# Patient Record
Sex: FEMALE | Employment: UNEMPLOYED | URBAN - NONMETROPOLITAN AREA
[De-identification: names, ages, dates, MRNs, and addresses within clinical notes are randomized per-mention and may not be internally consistent; named-entity substitution may affect disease eponyms.]

---
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
## 2025-03-03 ENCOUNTER — HOSPITAL ENCOUNTER (EMERGENCY)
Age: 20
Discharge: HOME | End: 2025-03-03
Attending: EMERGENCY MEDICINE
Payer: COMMERCIAL

## 2025-03-03 VITALS
BODY MASS INDEX: 33.96 KG/M2 | WEIGHT: 173 LBS | HEIGHT: 60 IN | SYSTOLIC BLOOD PRESSURE: 121 MMHG | RESPIRATION RATE: 14 BRPM | OXYGEN SATURATION: 100 % | TEMPERATURE: 98.9 F | DIASTOLIC BLOOD PRESSURE: 69 MMHG | HEART RATE: 87 BPM

## 2025-03-03 DIAGNOSIS — S01.01XA LACERATION OF SCALP, INITIAL ENCOUNTER: Primary | ICD-10-CM

## 2025-03-03 PROCEDURE — 99283 EMERGENCY DEPT VISIT LOW MDM: CPT

## 2025-03-03 PROCEDURE — 6370000000 HC RX 637 (ALT 250 FOR IP): Performed by: EMERGENCY MEDICINE

## 2025-03-03 PROCEDURE — 12001 RPR S/N/AX/GEN/TRNK 2.5CM/<: CPT

## 2025-03-03 RX ADMIN — Medication 3 ML: at 15:00

## 2025-03-03 ASSESSMENT — PAIN DESCRIPTION - PAIN TYPE: TYPE: ACUTE PAIN

## 2025-03-03 ASSESSMENT — PAIN - FUNCTIONAL ASSESSMENT: PAIN_FUNCTIONAL_ASSESSMENT: 0-10

## 2025-03-03 ASSESSMENT — PAIN DESCRIPTION - FREQUENCY: FREQUENCY: CONTINUOUS

## 2025-03-03 ASSESSMENT — PAIN SCALES - GENERAL: PAINLEVEL_OUTOF10: 8

## 2025-03-03 ASSESSMENT — PAIN DESCRIPTION - DESCRIPTORS: DESCRIPTORS: THROBBING

## 2025-03-03 ASSESSMENT — PAIN DESCRIPTION - LOCATION: LOCATION: HEAD

## 2025-03-03 NOTE — ED PROVIDER NOTES
[facility] Emergency Department  [address] [facility] [address]  Phone: [phone]      Patient Name:  Wilma Becerra  Medical Record Number:  [id number]  YOB: 2005  Date of Service:  3/3/2025  Primary Care Physician:  No primary care provider on file.      CHIEF COMPLAINT:       Chief Complaint   Patient presents with    Head Injury       HISTORY OF PRESENT ILLNESS:    Wilma Becerra is a 19 y.o. female who presents with the complaint of head injury.  She was doing chin ups with a assisted band and her teammates were supposed to help extricate her from these plans.  He did not resulting in her falling out of the bands and striking her head.  She denies any loss consciousness but as a result of the fall she does complain of left-sided chest pain, right humerus pain as well as a laceration to her left parietal/occipital scalp.  No neck pain.  No distal deficits.  No otorrhea or rhinorrhea.  No nausea or vomiting.  She went to urgent care and urgent care referred her here.  There has been no other contemporaneous evaluation or management of the symptoms prior to arrival.    CURRENT MEDICATIONS:      Current Discharge Medication List        CONTINUE these medications which have NOT CHANGED    Details   meloxicam (MOBIC) 15 MG tablet Take 1 tablet by mouth daily as needed for Pain  Qty: 30 tablet, Refills: 0    Associated Diagnoses: Right shoulder pain, unspecified chronicity      medroxyPROGESTERone (DEPO-PROVERA) 150 MG/ML injection Inject into the muscle             ALLERGIES:   has No Known Allergies.    PAST MEDICAL HISTORY:    has no past medical history on file.    SURGICAL HISTORY:      has no past surgical history on file.    FAMILY HISTORY:   has no family status information on file.      family history is not on file.    SOCIAL HISTORY:     reports that she has never smoked. She has never used smokeless tobacco. She reports that she does not drink alcohol and does not use